# Patient Record
Sex: FEMALE | Race: WHITE
[De-identification: names, ages, dates, MRNs, and addresses within clinical notes are randomized per-mention and may not be internally consistent; named-entity substitution may affect disease eponyms.]

---

## 2018-08-09 ENCOUNTER — HOSPITAL ENCOUNTER (EMERGENCY)
Dept: HOSPITAL 25 - ED | Age: 33
Discharge: HOME | End: 2018-08-09
Payer: COMMERCIAL

## 2018-08-09 VITALS — DIASTOLIC BLOOD PRESSURE: 72 MMHG | SYSTOLIC BLOOD PRESSURE: 138 MMHG

## 2018-08-09 DIAGNOSIS — S61.231A: Primary | ICD-10-CM
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
## 2018-08-09 PROCEDURE — 36415 COLL VENOUS BLD VENIPUNCTURE: CPT

## 2018-08-09 PROCEDURE — 86703 HIV-1/HIV-2 1 RESULT ANTBDY: CPT

## 2018-08-09 PROCEDURE — 86707 HEPATITIS BE ANTIBODY: CPT

## 2018-08-09 PROCEDURE — 86803 HEPATITIS C AB TEST: CPT

## 2018-08-09 PROCEDURE — 99281 EMR DPT VST MAYX REQ PHY/QHP: CPT

## 2018-08-09 PROCEDURE — 87340 HEPATITIS B SURFACE AG IA: CPT

## 2018-08-13 NOTE — ED
- HPI Summary


HPI Summary: 





[place] [facility] [place] with concern for possible blood 

exposure.  She endorses giving a subQ injection to a patient when accidently 

sticking herself while pulling back into the L index finger.  She was able to 

cleanse the area and discuss with her manager who sent her to the ED.  Denies 

any health concerns or any complaints.





- History of Current Complaint


Chief Complaint: EDExposureBodyFluid


Stated Complaint: FINGER STICK


Time Seen by Provider: 08/09/18 11:21


Needlestick: Solid Needle


Blood on Needle: Yes


Depth: superificial


Bleeding at Site: No


Body Fluid Exposure: Blood


Treatment PTA: Cleaned Wound





- Source Information


HIV: Unknown


Hepatitis: Unknown





- Risk Factors


Needlestick Risk Factor: Low Risk: Solid Needle





- Other


Discussed Post-Exposure prophylaxis (PEP) for HIV: Declined


Discussed PEP for Hepatitis-B: Declined





PMH/Surg Hx/FS Hx/Imm Hx


Previously Healthy: Yes





- Immunization History


Hx Pertussis Vaccination: No


Immunizations Up to Date: Yes


Infectious Disease History: No


Infectious Disease History: 


   Denies: Traveled Outside the  in Last 30 Days





- Social History


Occupation: Employed Full-time


Lives: With Family


Alcohol Use: Occasionally


Hx Substance Use: No


Substance Use Type: Reports: None


Hx Tobacco Use: No


Smoking Status (MU): Never Smoked Tobacco





Review of Systems


Constitutional: Negative


Positive: Fatigue


ENT: Negative


Cardiovascular: Negative


Respiratory: Negative


Gastrointestinal: Negative


Positive: Other - small pinpoint dot at the distal L fingertip


Neurological: Negative


Psychological: Normal


All Other Systems Reviewed And Are Negative: Yes





Physical Exam


Triage Information Reviewed: Yes


Vital Signs On Initial Exam: 


 Initial Vitals











Temp Pulse Resp BP Pulse Ox


 


 98.1 F   63   16   149/96   100 


 


 08/09/18 11:04  08/09/18 11:04  08/09/18 11:04  08/09/18 11:04  08/09/18 11:04











Vital Signs Reviewed: Yes


Appearance: Positive: Well-Appearing


Skin: Positive: Warm - small pinpoint dot at distal left index fingertip 

without bleeding, Skin Color Reflects Adequate Perfusion


Respiratory/Lung Sounds: Positive: Clear to Auscultation, Breath Sounds Present


Cardiovascular: Positive: RRR, Pulses are Symmetrical in both Upper and Lower 

Extremities


Musculoskeletal: Positive: Strength/ROM Intact


Neurological: Positive: Sensory/Motor Intact, Alert, Oriented to Person Place, 

Time


Psychiatric: Positive: Affect/Mood Appropriate





Diagnostics





- Vital Signs


 Vital Signs











  Temp Pulse Resp BP Pulse Ox


 


 08/09/18 12:22  98 F  75  16  138/72  99


 


 08/09/18 11:04  98.1 F  63  16  149/96  100














- Laboratory


Lab Results: 


 Lab Results











  08/09/18 08/09/18 08/09/18 Range/Units





  11:47 11:47 11:47 


 


Hep Bs Antigen  Nonreactive    (Nonreactive)  


 


Hepatitis Be Antibody    Negative  (Negative)  


 


Hepatitis C Antibody  Nonreactive    (Nonreactive)  


 


HIV 1&2 Antibody Rapid   Nonreactive   (Nonreactive)  











Lab Statement: Any lab studies that have been ordered have been reviewed, and 

results considered in the medical decision making process.





Needlestick Course/Dx





- Course


Course Of Treatment: Discussed PEP protocol.  As this was a small puncture 

wound without bleeding present, she is at an extremely low risk for cross 

contamination.  I have advised to not start PEP at this time.  Source patient 

is known and patient will ask supervisor to obtain records if that source 

patient consents.  As per our ED protocol, will draw HIV, Hep B and C 

antibodies and antigens.





- Diagnoses


Provider Diagnoses: 


 Needlestick injury accident








Discharge





- Sign-Out/Discharge


Documenting (check all that apply): Patient Departure





- Discharge Plan


Condition: Stable


Disposition: HOME


Referrals: 


Marcelo Dick NP [Nurse Practitioner] - 





- Billing Disposition and Condition


Condition: STABLE


Disposition: Home